# Patient Record
(demographics unavailable — no encounter records)

---

## 2024-11-01 NOTE — PHYSICAL EXAM

## 2024-11-01 NOTE — DISCUSSION/SUMMARY
[Normal Growth] : growth [Normal Development] : development [None] : No known medical problems [No Elimination Concerns] : elimination [No Feeding Concerns] : feeding [No Skin Concerns] : skin [Normal Sleep Pattern] : sleep [Assessment of Language Development] : assessment of language development [Temperament and Behavior] : temperament and behavior [Toilet Training] : toilet training [TV Viewing] : tv viewing [Safety] : safety [No Medications] : ~He/She~ is not on any medications [Mother] : mother [FreeTextEntry1] : Zoraida is a 2y F here for well child visit. Growing and developing appropriately. Has a hx of picky eating, but is starting to try more foods. No parental concerns.   Continue cow's milk. Continue table foods, 3 meals with 2-3 snacks per day. Incorporate fluorinated water daily in a sippy cup. Brush teeth twice a day with soft toothbrush. Recommend visit to dentist. When in car, keep child in rear-facing car seats until age 2, or until the maximum height and weight for seat is reached. Put toddler to sleep in own bed. Help toddler to maintain consistent daily routines and sleep schedule. Toilet training discussed. Ensure home is safe. Use consistent, positive discipline. Read aloud to toddler. Limit screen time to no more than 2 hours per day.  Passed MCHAT CBC/Pb done in 7/2024 are WNL Declined Flu vaccine SDOH domains were screened and scored. RTC in 6 months for WCC

## 2024-11-01 NOTE — HISTORY OF PRESENT ILLNESS
[Mother] : mother [Cow's milk (Ounces per day ___)] : consumes [unfilled] oz of Cow's milk per day [Fruit] : fruit [Vegetables] : vegetables [Meat] : meat [Eggs] : eggs [Table food] : table food [Dairy] : dairy [Normal] : Normal [In bed] : In bed [Sippy cup use] : Sippy cup use [Brushing teeth] : Brushing teeth [Playtime 60 min a day] : Playtime 60 min a day [Toilet Training] : Toilet training [<2 hrs of screen time] : Less than 2 hrs of screen time [No] : Not at  exposure [Water heater temperature set at <120 degrees F] : Water heater temperature set at <120 degrees F [Car seat in back seat] : Car seat in back seat [Smoke Detectors] : Smoke detectors [Carbon Monoxide Detectors] : Carbon monoxide detectors [Up to date] : Up to date [NO] : No [Finger Foods] : finger foods [Toothpaste] : Primary Fluoride Source: Toothpaste [Exposure to electronic nicotine delivery system] : No exposure to electronic nicotine delivery system [At risk for exposure to TB] : Not at risk for exposure to Tuberculosis [de-identified] : starting to have more varied diet [FreeTextEntry3] : 10 hrs at night, sleeps well in her own bed [de-identified] : declines flu shot today

## 2025-01-27 NOTE — PHYSICAL EXAM
[NL] : moves all extremities x4, warm, well perfused x4 [Trunk] : trunk [Arms] : arms [Legs] : legs [Buttocks] : buttocks [de-identified] : diffuse erythematous, eczematous patches, particularly on flexor surfaces, on background of hyper and hypo pigmented area

## 2025-01-27 NOTE — HISTORY OF PRESENT ILLNESS
[Derm Symptoms] : DERM SYMPTOMS [___ Week(s)] : [unfilled] week(s) [Constant] : constant [FreeTextEntry9] : progressive [de-identified] : rash [FreeTextEntry6] : 1yo F with diffuse, whole body pruritic rash Started 1 week ago, has been progressing. Mother using aquaphor with no benefit Had similar rash last year which responded to aquafor No fevers, URI symptoms

## 2025-01-27 NOTE — HISTORY OF PRESENT ILLNESS
[Derm Symptoms] : DERM SYMPTOMS [___ Week(s)] : [unfilled] week(s) [Constant] : constant [FreeTextEntry9] : progressive [de-identified] : rash [FreeTextEntry6] : 1yo F with diffuse, whole body pruritic rash Started 1 week ago, has been progressing. Mother using aquaphor with no benefit Had similar rash last year which responded to aquafor No fevers, URI symptoms

## 2025-01-27 NOTE — DISCUSSION/SUMMARY
[FreeTextEntry1] : 1 yo F presenting with 1 week of progressive, itchy rash on extremities and trunk Exam consistent with eczema has not been improving with aquafor Discussed using moisturizing creams (such as Eucerin, Aveeno, Cetaphil, ect) along with aquafor during winter times, after baths Will send for hydrocort cream for active flare areas. Discussed limiting use to no more than 7 days.

## 2025-01-27 NOTE — PHYSICAL EXAM
[NL] : moves all extremities x4, warm, well perfused x4 [Trunk] : trunk [Arms] : arms [Legs] : legs [Buttocks] : buttocks [de-identified] : diffuse erythematous, eczematous patches, particularly on flexor surfaces, on background of hyper and hypo pigmented area

## 2025-04-28 NOTE — PHYSICAL EXAM
[Alert] : alert [Playful] : playful [Normocephalic] : normocephalic [Conjunctivae with no discharge] : conjunctivae with no discharge [PERRL] : PERRL [EOMI Bilateral] : EOMI bilateral [Auricles Well Formed] : auricles well formed [Clear Tympanic membranes with present light reflex and bony landmarks] : clear tympanic membranes with present light reflex and bony landmarks [Nares Patent] : nares patent [Pink Nasal Mucosa] : pink nasal mucosa [No Caries] : no caries [Supple, full passive range of motion] : supple, full passive range of motion [No Palpable Masses] : no palpable masses [Clear to Auscultation Bilaterally] : clear to auscultation bilaterally [Regular Rate and Rhythm] : regular rate and rhythm [Normal S1, S2 present] : normal S1, S2 present [No Murmurs] : no murmurs [Soft] : soft [NonTender] : non tender [Non Distended] : non distended [Normoactive Bowel Sounds] : normoactive bowel sounds [Adrian 1] : Adrian 1 [Normal Vagina Introitus] : normal vagina introitus [Normally Placed] : normally placed [No Abnormal Lymph Nodes Palpated] : no abnormal lymph nodes palpated [No Gait Asymmetry] : no gait asymmetry [Normal Muscle Tone] : normal muscle tone [Straight] : straight [Cranial Nerves Grossly Intact] : cranial nerves grossly intact [No Rash or Lesions] : no rash or lesions [de-identified] : small patches of hypopigmentation on b/l arms but no dry rough erythematous patches

## 2025-04-28 NOTE — DEVELOPMENTAL MILESTONES
[Normal Development] : Normal Development [None] : none [Urinates in a potty or toilet] : urinates in a potty or toilet [Plays pretend with toys or dolls] : plays pretend with toys or dolls [Explains the reason for things,] : explains the reason for things, such as needing a sweater when it's cold [Names at least one color] : names at least one color [Walks up steps, using one] : walks up steps, using one foot, then the other [Runs well without falling] : runs well without falling [Grasps crayon with thumb] : grasps crayon with thumb and fingers instead of fist [Catches a large ball] : catches a large ball [Passed] : passed

## 2025-04-28 NOTE — HISTORY OF PRESENT ILLNESS
[Mother] : mother [whole ___ oz/d] : consumes [unfilled] oz of whole milk per day [Fruit] : fruit [Vegetables] : vegetables [Meat] : meat [Eggs] : eggs [Fish] : fish [___ voids per day] : [unfilled] voids per day [Normal] : Normal [In bed] : In bed [Sippy cup use] : Sippy cup use [Bottle Use] : Bottle use [Brushing teeth] : Brushing teeth [Toothpaste] : Primary Fluoride Source: Toothpaste [Playtime (60 min/d)] : Playtime 60 min a day [Car seat in back seat] : Car seat in back seat [Carbon Monoxide Detectors] : Carbon monoxide detectors [Smoke Detectors] : Smoke detectors [Supervised play near cars and streets] : Supervised play near cars and streets [Up to date] : Up to date [NO] : No [No] : Patient does not go to dentist yearly [In nursery school] : In nursery school [Water heater temperature set at <120 degrees F] : Water heater temperature set at <120 degrees F [Exposure to electronic nicotine delivery system] : No exposure to electronic nicotine delivery system [FreeTextEntry7] : 1 y/o with hx of eczema presenting for 30WCC. No recent illnesses. No hospitalizations or ED visits.  [de-identified] : Eating well balance diet.  [FreeTextEntry8] : Often has BM once a day but has had more episodes of constipated, having a BM every 2-3days. Hard and pebbly. Occasionally blood around the stool. Mom has been increasing fiberous foods.  [FreeTextEntry3] : Sleeping through the night [de-identified] : Still using bottle for milk. [de-identified] : Needs dentist.  [FreeTextEntry1] : Eczema improved with change in the weather, last flare earlier this month  Using Cerave 2-3 times a day. Using hydrocortisone for severe flares only.

## 2025-04-28 NOTE — DISCUSSION/SUMMARY
[Normal Growth] : growth [Normal Development] : development [None] : No known medical problems [No Elimination Concerns] : elimination [No Feeding Concerns] : feeding [No Skin Concerns] : skin [Normal Sleep Pattern] : sleep [Family Routines] : family routines [Language Promotion and Communication] : language promotion and communication [Social Development] : social development [ Considerations] :  considerations [Safety] : safety [No Medications] : ~He/She~ is not on any medications [Parent/Guardian] : parent/guardian [FreeTextEntry1] : Zoraida is a 1y/o F with history of eczema presenting for 30mo [facility]. She is doing well with no recent illnesses. She continues to grow and track along her growth curves well. Eczema has been well controlled with daily Cerave use. Zoraida experiencing constipation, recommended improving dietary fiber and fluid intake (32-40 ounces daily). Advised starting prune juice or MiraLAX, starting at half a cap daily and titrating as needed to achieve soft bowel movements. Reassuring physical exam with abdomen soft and non-tender. Emphasized the importance of discontinuing bottle use and following up with dentist. Developmentally appropriate. MCHAT passed.  Immunizations are up to date.   Continue cow's milk. Continue table foods, 3 meals with 2-3 snacks per day. Incorporate fluorinated water daily in a sippy cup. Brush teeth twice a day with soft toothbrush. Recommend visit to dentist. When in car, keep child in rear-facing car seats until age 2, or until  the maximum height and weight for seat is reached. Put toddler to sleep in own bed. Help toddler to maintain consistent daily routines and sleep schedule. Toilet training discussed. Ensure home is safe. Use consistent, positive discipline. Read aloud to toddler. Limit screen time to no more than 2 hours per day.  Return at 3 years of age for routine well child check or earlier if needed.